# Patient Record
Sex: MALE | Race: WHITE | Employment: OTHER | URBAN - METROPOLITAN AREA
[De-identification: names, ages, dates, MRNs, and addresses within clinical notes are randomized per-mention and may not be internally consistent; named-entity substitution may affect disease eponyms.]

---

## 2018-07-20 ENCOUNTER — OFFICE VISIT (OUTPATIENT)
Dept: PEDIATRICS | Facility: CLINIC | Age: 48
End: 2018-07-20
Payer: COMMERCIAL

## 2018-07-20 ENCOUNTER — RADIANT APPOINTMENT (OUTPATIENT)
Dept: GENERAL RADIOLOGY | Facility: CLINIC | Age: 48
End: 2018-07-20
Attending: INTERNAL MEDICINE
Payer: COMMERCIAL

## 2018-07-20 VITALS
WEIGHT: 191 LBS | BODY MASS INDEX: 26.74 KG/M2 | SYSTOLIC BLOOD PRESSURE: 114 MMHG | TEMPERATURE: 98 F | OXYGEN SATURATION: 98 % | HEART RATE: 68 BPM | DIASTOLIC BLOOD PRESSURE: 70 MMHG | HEIGHT: 71 IN

## 2018-07-20 DIAGNOSIS — S49.92XA SHOULDER INJURY, LEFT, INITIAL ENCOUNTER: Primary | ICD-10-CM

## 2018-07-20 PROCEDURE — 99213 OFFICE O/P EST LOW 20 MIN: CPT | Performed by: INTERNAL MEDICINE

## 2018-07-20 PROCEDURE — 73030 X-RAY EXAM OF SHOULDER: CPT | Mod: LT

## 2018-07-20 NOTE — PATIENT INSTRUCTIONS
Rotator Cuff Tear  The rotator cuff is a group of muscles and tendons that surround the shoulder joint. These muscles and tendons hold the arm in its joint. They also help the shoulder to rotate. The rotator cuff can be torn from overuse or injury. Gradual wear and tear can lead to inflammation of these tendons. This can progress to gradual or sudden tears.  Symptoms of a torn rotator cuff include:    Shoulder pain that gets worse when you raise your arm overhead    Weakness of the shoulder muscles with overhead activity    Popping and clicking when you move your shoulder    Shoulder pain that wakes you up at night when sleeping on the hurt shoulder  Diagnosis is made by an MRI or arthroscopy. This is a surgical procedure to look inside the joint through a small tube. Partial rotator cuff tears can be treated by first resting, then strengthening the rotator cuff muscles.  Anti-inflammatory medicines, such as ibuprofen or naproxen, are useful. A limited number of steroid injections can be given. Surgery may be recommended for complete tears and partial tears that do not respond to medical treatment.  Home care    Avoid activities that make your pain worse. This includes overhead activities, doing the same motion over and over, and heavy lifting.    You may use over-the-counter pain medicines to control pain, unless another medicine was prescribed. If you have chronic liver or kidney disease or ever had a stomach ulcer or GI bleeding, talk with your healthcare provider before using these medicines.    If you were given a sling, use it for comfort. After your pain decreases, don t keep your arm in the sling all the time. Take your arm out several times a day and move the shoulder joint, as you are able.    Your healthcare provider may recommend gentle pendulum exercises. Stand or sit with your arm vertical and close to your side. Relax your shoulder muscles and gently swing the arm forward and back, side to side, and  in small circles for about 5 minutes. Do this once or twice a day. There should be only slight pain with this exercise.    You may benefit from physical therapy or a home exercise program to strengthen your shoulder muscles. This will also increase your pain-free range of motion. Applying heat prior to exercises can help prepare the muscles and joint for activity. Talk to your healthcare provider about what is best for your condition.  Follow-up care  Follow up with your healthcare provider, or as advised.  When to seek medical advice  Call your healthcare provider right away if any of the following occur:    Increasing shoulder pain    Rapid swelling in the involved shoulder or arm    Numbness, tingling, or pain radiating down the arm to the hand    Loss of strength in the affected arm  Date Last Reviewed: 11/23/2015 2000-2017 [employer]. [address] [place] [address], [place] [address]. All rights reserved. This information is not intended as a substitute for professional medical care. Always follow your healthcare professional's instructions.

## 2018-07-20 NOTE — PROGRESS NOTES
"  SUBJECTIVE:   Frank Nieves is a 48 year old male who presents to clinic today for the following health issues:      Musculoskeletal problem/pain      Duration: 2 days ago    Description  Location: left shoulder    Intensity:  moderate, severe    Accompanying signs and symptoms: radiation of pain to neck and tingling    History  Previous similar problem: no   Previous evaluation:  none    Precipitating or alleviating factors:  Trauma or overuse: YES- fell  Aggravating factors include: all movement    Therapies tried and outcome: ice and aleve     HPI: Fell on outstretched hand 4 days ago injuring his left shoulder.  Shoulder is painful when lifting up the hand.    PMH:   There is no problem list on file for this patient.    History reviewed. No pertinent surgical history.    Social History   Substance Use Topics     Smoking status: Current Some Day Smoker     Smokeless tobacco: Never Used     Alcohol use Not on file     History reviewed. No pertinent family history.      No current outpatient prescriptions on file.     Allergies   Allergen Reactions     Penicillins        ROS:  CONSTITUTIONAL: NEGATIVE for fever, chills, change in weight    OBJECTIVE:                                                    /70 (BP Location: Right arm, Cuff Size: Adult Large)  Pulse 68  Temp 98  F (36.7  C) (Oral)  Ht 5' 10.5\" (1.791 m)  Wt 191 lb (86.6 kg)  SpO2 98%  BMI 27.02 kg/m2  Body mass index is 27.02 kg/(m^2).     GENERAL: healthy, alert and no distress  MS: LUE exam shows normal strength and muscle mass, no erythema, induration, or nodules and no evidence of joint effusion.  Unable to abduct the left arm above 90  due to tenderness.    Diagnostic Test Results:  Results for orders placed or performed in visit on 07/20/18 (from the past 24 hour(s))   XR Shoulder Left G/E 3 Views    Narrative    XR SHOULDER LT G/E 3 VW 7/20/2018 10:21 AM    COMPARISON: None.    HISTORY: LEFT shoulder injury.      Impression    " IMPRESSION: No fracture or dislocation seen in the LEFT shoulder.  Joints are preserved. No significant soft tissue swelling.    PERCY CASTRO MD        ASSESSMENT/PLAN:                                                    Shoulder injury, left, initial encounter    Possible rotator cuff tear.  Conservative therapy for now and if no improvement can proceed with orthopedic consultation.    - XR Shoulder Left G/E 3 Views    Patient Instructions     Rotator Cuff Tear  The rotator cuff is a group of muscles and tendons that surround the shoulder joint. These muscles and tendons hold the arm in its joint. They also help the shoulder to rotate. The rotator cuff can be torn from overuse or injury. Gradual wear and tear can lead to inflammation of these tendons. This can progress to gradual or sudden tears.  Symptoms of a torn rotator cuff include:    Shoulder pain that gets worse when you raise your arm overhead    Weakness of the shoulder muscles with overhead activity    Popping and clicking when you move your shoulder    Shoulder pain that wakes you up at night when sleeping on the hurt shoulder  Diagnosis is made by an MRI or arthroscopy. This is a surgical procedure to look inside the joint through a small tube. Partial rotator cuff tears can be treated by first resting, then strengthening the rotator cuff muscles.  Anti-inflammatory medicines, such as ibuprofen or naproxen, are useful. A limited number of steroid injections can be given. Surgery may be recommended for complete tears and partial tears that do not respond to medical treatment.  Home care    Avoid activities that make your pain worse. This includes overhead activities, doing the same motion over and over, and heavy lifting.    You may use over-the-counter pain medicines to control pain, unless another medicine was prescribed. If you have chronic liver or kidney disease or ever had a stomach ulcer or GI bleeding, talk with your healthcare  provider before using these medicines.    If you were given a sling, use it for comfort. After your pain decreases, don t keep your arm in the sling all the time. Take your arm out several times a day and move the shoulder joint, as you are able.    Your healthcare provider may recommend gentle pendulum exercises. Stand or sit with your arm vertical and close to your side. Relax your shoulder muscles and gently swing the arm forward and back, side to side, and in small circles for about 5 minutes. Do this once or twice a day. There should be only slight pain with this exercise.    You may benefit from physical therapy or a home exercise program to strengthen your shoulder muscles. This will also increase your pain-free range of motion. Applying heat prior to exercises can help prepare the muscles and joint for activity. Talk to your healthcare provider about what is best for your condition.  Follow-up care  Follow up with your healthcare provider, or as advised.  When to seek medical advice  Call your healthcare provider right away if any of the following occur:    Increasing shoulder pain    Rapid swelling in the involved shoulder or arm    Numbness, tingling, or pain radiating down the arm to the hand    Loss of strength in the affected arm  Date Last Reviewed: 11/23/2015 2000-2017 [employer]. [address] [place] [address], [place] [address]. All rights reserved. This information is not intended as a substitute for professional medical care. Always follow your healthcare professional's instructions.                Will call or return to clinic if worsening or symptoms not improving as discussed.      Marquez Calderon MD  [facility]

## 2018-07-20 NOTE — MR AVS SNAPSHOT
After Visit Summary   7/20/2018    Frank Nieves    MRN: [id number]           Patient Information     Date Of Birth          1970        Visit Information        Provider Department      7/20/2018 9:40 AM Marquez Calderon MD [facility]        Today's Diagnoses     Shoulder injury, left, initial encounter    -  1      Care Instructions      Rotator Cuff Tear  The rotator cuff is a group of muscles and tendons that surround the shoulder joint. These muscles and tendons hold the arm in its joint. They also help the shoulder to rotate. The rotator cuff can be torn from overuse or injury. Gradual wear and tear can lead to inflammation of these tendons. This can progress to gradual or sudden tears.  Symptoms of a torn rotator cuff include:    Shoulder pain that gets worse when you raise your arm overhead    Weakness of the shoulder muscles with overhead activity    Popping and clicking when you move your shoulder    Shoulder pain that wakes you up at night when sleeping on the hurt shoulder  Diagnosis is made by an MRI or arthroscopy. This is a surgical procedure to look inside the joint through a small tube. Partial rotator cuff tears can be treated by first resting, then strengthening the rotator cuff muscles.  Anti-inflammatory medicines, such as ibuprofen or naproxen, are useful. A limited number of steroid injections can be given. Surgery may be recommended for complete tears and partial tears that do not respond to medical treatment.  Home care    Avoid activities that make your pain worse. This includes overhead activities, doing the same motion over and over, and heavy lifting.    You may use over-the-counter pain medicines to control pain, unless another medicine was prescribed. If you have chronic liver or kidney disease or ever had a stomach ulcer or GI bleeding, talk with your healthcare provider before using these medicines.    If you were given a sling, use it for  comfort. After your pain decreases, don t keep your arm in the sling all the time. Take your arm out several times a day and move the shoulder joint, as you are able.    Your healthcare provider may recommend gentle pendulum exercises. Stand or sit with your arm vertical and close to your side. Relax your shoulder muscles and gently swing the arm forward and back, side to side, and in small circles for about 5 minutes. Do this once or twice a day. There should be only slight pain with this exercise.    You may benefit from physical therapy or a home exercise program to strengthen your shoulder muscles. This will also increase your pain-free range of motion. Applying heat prior to exercises can help prepare the muscles and joint for activity. Talk to your healthcare provider about what is best for your condition.  Follow-up care  Follow up with your healthcare provider, or as advised.  When to seek medical advice  Call your healthcare provider right away if any of the following occur:    Increasing shoulder pain    Rapid swelling in the involved shoulder or arm    Numbness, tingling, or pain radiating down the arm to the hand    Loss of strength in the affected arm  Date Last Reviewed: 11/23/2015 2000-2017 [employer]. [address] [place] [address]. All rights reserved. This information is not intended as a substitute for professional medical care. Always follow your healthcare professional's instructions.                Follow-ups after your visit        Who to contact     If you have questions or need follow up information about today's clinic visit or your schedule please contact [facility] directly at [phone].  Normal or non-critical lab and imaging results will be communicated to you by MyChart, letter or phone within 4 business days after the clinic has received the results. If you do not hear from us within 7 days, please contact the clinic through [employer] or  "phone. If you have a critical or abnormal lab result, we will notify you by phone as soon as possible.  Submit refill requests through [employer] or call your pharmacy and they will forward the refill request to us. Please allow 3 business days for your refill to be completed.          Additional Information About Your Visit        [employer] Information     [employer] lets you send messages to your doctor, view your test results, renew your prescriptions, schedule appointments and more. To sign up, go to [email or web address] . Click on \"Log in\" on the left side of the screen, which will take you to the Welcome page. Then click on \"Sign up Now\" on the right side of the page.     You will be asked to enter the access code listed below, as well as some personal information. Please follow the directions to create your username and password.     Your access code is: [email or web address]  Expires: 10/18/2018 11:01 AM     Your access code will  in 90 days. If you need help or a new code, please call your [place] clinic or [phone].        Care EveryWhere ID     This is your Care EveryWhere ID. This could be used by other organizations to access your [place] medical records  [id number]        Your Vitals Were     Pulse Temperature Height Pulse Oximetry BMI (Body Mass Index)       68 98  F (36.7  C) (Oral) 5' 10.5\" (1.791 m) 98% 27.02 kg/m2        Blood Pressure from Last 3 Encounters:   18 114/70    Weight from Last 3 Encounters:   18 191 lb (86.6 kg)              We Performed the Following     XR Shoulder Left G/E 3 Views        Primary Care Provider Office Phone # Fax #    [facility] [phone] [address] [facility] [address]        Equal Access to Services     ARACELY CORTEZ AH: Nadeem grahamo Jeb, waaxda luqadaha, qaybta kaalmada michael, gabe christine. So [facility] [phone].    ATENCIÓN: Si sandrola español, tiene a zuluaga " disposición servicios gratuitos de asistencia lingüística. Juana yeung [phone].    We comply with applicable federal civil rights laws and [place] laws. We do not discriminate on the basis of race, color, national origin, age, disability, sex, sexual orientation, or gender identity.            Thank you!     Thank you for choosing [facility]  for your care. Our goal is always to provide you with excellent care. Hearing back from our patients is one way we can continue to improve our services. Please take a few minutes to complete the written survey that you may receive in the mail after your visit with us. Thank you!             Your Updated Medication List - Protect others around you: Learn how to safely use, store and throw away your medicines at www.disposemymeds.org.      Notice  As of 7/20/2018 11:01 AM    You have not been prescribed any medications.